# Patient Record
Sex: MALE | Race: WHITE | URBAN - METROPOLITAN AREA
[De-identification: names, ages, dates, MRNs, and addresses within clinical notes are randomized per-mention and may not be internally consistent; named-entity substitution may affect disease eponyms.]

---

## 2018-08-10 ENCOUNTER — OFFICE VISIT (OUTPATIENT)
Dept: URGENT CARE | Facility: URGENT CARE | Age: 68
End: 2018-08-10
Payer: MEDICARE

## 2018-08-10 VITALS
TEMPERATURE: 98 F | HEART RATE: 55 BPM | SYSTOLIC BLOOD PRESSURE: 118 MMHG | OXYGEN SATURATION: 94 % | DIASTOLIC BLOOD PRESSURE: 72 MMHG | RESPIRATION RATE: 16 BRPM

## 2018-08-10 DIAGNOSIS — M79.631 PAIN OF RIGHT FOREARM: ICD-10-CM

## 2018-08-10 DIAGNOSIS — S50.811A FOREARM ABRASION, RIGHT, INITIAL ENCOUNTER: Primary | ICD-10-CM

## 2018-08-10 DIAGNOSIS — Z23 IMMUNIZATION DUE: ICD-10-CM

## 2018-08-10 PROCEDURE — 99203 OFFICE O/P NEW LOW 30 MIN: CPT | Mod: 25 | Performed by: FAMILY MEDICINE

## 2018-08-10 PROCEDURE — 90471 IMMUNIZATION ADMIN: CPT | Performed by: FAMILY MEDICINE

## 2018-08-10 PROCEDURE — 90714 TD VACC NO PRESV 7 YRS+ IM: CPT | Performed by: FAMILY MEDICINE

## 2018-08-10 NOTE — NURSING NOTE
Screening Questionnaire for Adult Immunization    Are you sick today?   No   Do you have allergies to medications, food, a vaccine component or latex?   No   Have you ever had a serious reaction after receiving a vaccination?   No   Do you have a long-term health problem with heart disease, lung disease, asthma, kidney disease, metabolic disease (e.g. diabetes), anemia, or other blood disorder?   No   Do you have cancer, leukemia, HIV/AIDS, or any other immune system problem?   No   In the past 3 months, have you taken medications that affect  your immune system, such as prednisone, other steroids, or anticancer drugs; drugs for the treatment of rheumatoid arthritis, Crohn s disease, or psoriasis; or have you had radiation treatments?   No   Have you had a seizure, or a brain or other nervous system problem?   No   During the past year, have you received a transfusion of blood or blood     products, or been given immune (gamma) globulin or antiviral drug?   No   For women: Are you pregnant or is there a chance you could become        pregnant during the next month?   No   Have you received any vaccinations in the past 4 weeks?   No     Immunization questionnaire answers were all negative.        Per orders of Dr. Kerns, injection of TD given by JUDI ADAMS. Patient instructed to remain in clinic for 15 minutes afterwards, and to report any adverse reaction to me immediately.       Screening performed by JUDI ADAMS on 8/10/2018 at 4:15 PM.

## 2018-08-10 NOTE — PROGRESS NOTES
SUBJECTIVE:  Chief Complaint   Patient presents with     Urgent Care     Pt c/o abrasion on right arm     Sen Mcfadden is a 67 year old male who presents with a chief complaint of right forearm pain and redness.  Symptoms began 1 hour(s) ago, are moderate and sudden onset  Context:  Injury:Yes: .  Injury happened while while helping neighbor at the back yard was fixing a wooden furniture when the wood pice slipped off from the saw and hit his rt forearm . How: trauma: , direct blow immediate pain, deformity was immediately noted.   Pain exacerbated by none Relieved by nothing.  He treated it initially with ice. This is the first time this type of injury has occurred to this patient.     No past medical history on file.  Current Outpatient Prescriptions   Medication Sig Dispense Refill     amLODIPine (NORVASC) 10 MG tablet Take 10 mg by mouth daily       Amlodipine Besylate-Valsartan (EXFORGE) 5-160 MG TABS Take 1 tablet by mouth daily       aspirin (TONY LOW DOSE) 81 MG EC tablet Take 81 mg by mouth daily       atorvastatin (LIPITOR) 10 MG tablet Take 10 mg by mouth daily       levothyroxine (SYNTHROID,LEVOTHROID) 100 MCG tablet Take 1 tablet by mouth daily       LUTEIN PO Take 1 tablet by mouth daily       ofloxacin (OCUFLOX) 0.3 % ophthalmic solution 10 drops daily.        tamsulosin (FLOMAX) 0.4 MG 24 hr capsule Take 1 capsule by mouth daily       zolpidem (AMBIEN) 10 MG tablet Take 1 tablet by mouth nightly as needed       Social History   Substance Use Topics     Smoking status: Former Smoker     Types: Cigarettes     Smokeless tobacco: Never Used     Alcohol use Not on file       ROS:  Review of systems negative except as stated below    EXAM:   /72  Pulse 55  Temp 98  F (36.7  C) (Oral)  Resp 16  SpO2 94%  M/S Exam:forearmerythema, swelling and tenderness to palpationGENERAL APPEARANCE: healthy, alert and no distress  EXTREMITIES: peripheral pulses normal  SKIN: 3 cm swelling noted over the  ulnar aspect of the distal forearm soft in consistency   Related to the injury , there area 3 superficial longitudinal abrasions noted   NEURO: Normal strength and tone, sensory exam grossly normal, mentation intact and speech normal    Skin abrasions cleaned   Then bacitracin applied and then dressed with telfa     X-RAY was not done.    ASSESSMENT:     Encounter Diagnoses   Name Primary?     Forearm abrasion, right, initial encounter Yes     Pain of right forearm      Sen was seen today for urgent care.    Diagnoses and all orders for this visit:    Forearm abrasion, right, initial encounter    Pain of right forearm    Immunization due  -     Discontinue: Td (tetanus & diphtheria toxoids) -  adult formulation - for ages 7 years and older; Inject 0.5 mLs into the muscle once for 1 dose  -     Discontinue: Td (tetanus & diphtheria toxoids) -  adult formulation - for ages 7 years and older; Inject 0.5 mLs into the muscle once for 1 dose  -     TD (ADULT, 7+) PRESERVE FREE      Discussed with pt about signs of infection   If notices any streaking should follow up

## 2018-08-10 NOTE — MR AVS SNAPSHOT
After Visit Summary   8/10/2018    Sen Mcfadden    MRN: [id number]           Patient Information     Date Of Birth          1950        Visit Information        Provider Department      8/10/2018 3:15 PM Radha Kerns MD [facility]        Today's Diagnoses     Forearm abrasion, right, initial encounter    -  1    Pain of right forearm        Immunization due          Care Instructions      Abrasions  Abrasions are skin scrapes. Their treatment depends on how large and deep the abrasion is.  Home care  You may be prescribed an antibiotic cream or ointment to apply to the wound. This helps prevent infection. Follow instructions when using this medicine.  General care    To care for the abrasion, do the following each day for as long as directed by your healthcare provider.  ? If you were given a bandage, change it once a day. If your bandage sticks to the wound, soak it in warm water until it loosens.  ? Wash the area with soap and warm water. You may do this in a sink or under a tub faucet or shower. Rinse off the soap. Then pat the area dry with a clean towel.  ? If antibiotic ointment or cream was prescribed, reapply it to the wound as directed. Cover the wound with a fresh nonstick bandage. If the bandage becomes wet or dirty, change it as soon as possible.  ? Some antibiotic ointments or cream can cause an allergic reaction or dermatitis. This may cause redness, itching and or hives. If this occurs, stop using the ointment immediately and wash off any remaining ointment. You may need to take some allergy medicine to relieve symptoms.    You may use acetaminophen or ibuprofen to control pain unless another pain medicine was prescribed. Talk with your healthcare provider before using these medicines if you have chronic liver or kidney disease or ever had a stomach ulcer or GI bleeding. Don t use ibuprofen in children younger than six months old.    Most skin  "wounds heal within 10 days. But an infection may occur even with treatment. So it s important to watch the wound for signs of infection as listed below.  Follow-up care  Follow up with your healthcare provider, or as advised.  When to seek medical advice  Call your healthcare provider right away if any of these occur:    Fever of 100.4 F (38 C) or higher, or as directed by your healthcare provider    Increasing pain, redness, swelling, or drainage from the wound    Bleeding from the wound that does not stop after a few minutes of steady, firm pressure    Decreased ability to move any body part near the wound  Date Last Reviewed: 3/3/2017    7329-9193 [employer]. [address] [place] [address]. All rights reserved. This information is not intended as a substitute for professional medical care. Always follow your healthcare professional's instructions.                Follow-ups after your visit        Who to contact     If you have questions or need follow up information about today's clinic visit or your schedule please contact [facility] directly at [phone].  Normal or non-critical lab and imaging results will be communicated to you by Allen Brothershart, letter or phone within 4 business days after the clinic has received the results. If you do not hear from us within 7 days, please contact the clinic through [facility] or phone. If you have a critical or abnormal lab result, we will notify you by phone as soon as possible.  Submit refill requests through [employer] or call your pharmacy and they will forward the refill request to us. Please allow 3 business days for your refill to be completed.          Additional Information About Your Visit        [employer] Information     [employer] lets you send messages to your doctor, view your test results, renew your prescriptions, schedule appointments and more. To sign up, go to [email or web address] . Click on \"Log in\" on the " "left side of the screen, which will take you to the Welcome page. Then click on \"Sign up Now\" on the right side of the page.     You will be asked to enter the access code listed below, as well as some personal information. Please follow the directions to create your username and password.     Your access code is: [email or web address]  Expires: 2018  3:49 PM     Your access code will  in 90 days. If you need help or a new code, please call your [facility] clinic or [phone].        Care EveryWhere ID     This is your Care EveryWhere ID. This could be used by other organizations to access your [facility] medical records  [id number]        Your Vitals Were     Pulse Temperature Respirations Pulse Oximetry          55 98  F (36.7  C) (Oral) 16 94%         Blood Pressure from Last 3 Encounters:   08/10/18 118/72   13 133/75    Weight from Last 3 Encounters:   13 198 lb (89.8 kg)              Today, you had the following     No orders found for display         Today's Medication Changes          These changes are accurate as of 8/10/18  3:49 PM.  If you have any questions, ask your nurse or doctor.               Start taking these medicines.        Dose/Directions    tetanus & diphtheria toxoids (adult) 5-2 LFU injection   Used for:  Immunization due   Started by:  Radha Kerns MD        Dose:  0.5 mL   Inject 0.5 mLs into the muscle once for 1 dose   Quantity:  0.5 mL   Refills:  0            Where to get your medicines      Some of these will need a paper prescription and others can be bought over the counter.  Ask your nurse if you have questions.     You don't need a prescription for these medications     tetanus & diphtheria toxoids (adult) 5-2 LFU injection                Primary Care Provider Fax #    Physician No Ref-Primary [phone]       No address on file        Equal Access to Services     ROBERT MORALES AH: Christo De Santiago, mahogany kinney, yenifer chanel " lorraine escobedogretchen ramirez'aan ah. So [facility] [phone].    ATENCIÓN: Si pinkyla chauncey, tiene a gleason disposición servicios gratuitos de asistencia lingüística. Chase guallpa [phone].    We comply with applicable federal civil rights laws and [place] laws. We do not discriminate on the basis of race, color, national origin, age, disability, sex, sexual orientation, or gender identity.            Thank you!     Thank you for choosing [facility]  for your care. Our goal is always to provide you with excellent care. Hearing back from our patients is one way we can continue to improve our services. Please take a few minutes to complete the written survey that you may receive in the mail after your visit with us. Thank you!             Your Updated Medication List - Protect others around you: Learn how to safely use, store and throw away your medicines at www.disposemymeds.org.          This list is accurate as of 8/10/18  3:49 PM.  Always use your most recent med list.                   Brand Name Dispense Instructions for use Diagnosis    amLODIPine 10 MG tablet    NORVASC     Take 10 mg by mouth daily        TONY LOW DOSE 81 MG EC tablet   Generic drug:  aspirin      Take 81 mg by mouth daily        EXFORGE 5-160 MG Tabs   Generic drug:  Amlodipine Besylate-Valsartan      Take 1 tablet by mouth daily        levothyroxine 100 MCG tablet    SYNTHROID/LEVOTHROID     Take 1 tablet by mouth daily        LIPITOR 10 MG tablet   Generic drug:  atorvastatin      Take 10 mg by mouth daily        LUTEIN PO      Take 1 tablet by mouth daily        ofloxacin 0.3 % ophthalmic solution    OCUFLOX     10 drops daily.        tamsulosin 0.4 MG capsule    FLOMAX     Take 1 capsule by mouth daily        tetanus & diphtheria toxoids (adult) 5-2 LFU injection     0.5 mL    Inject 0.5 mLs into the muscle once for 1 dose    Immunization due       zolpidem 10 MG tablet    AMBIEN     Take 1 tablet by mouth  nightly as needed

## 2018-08-10 NOTE — PATIENT INSTRUCTIONS
Abrasions  Abrasions are skin scrapes. Their treatment depends on how large and deep the abrasion is.  Home care  You may be prescribed an antibiotic cream or ointment to apply to the wound. This helps prevent infection. Follow instructions when using this medicine.  General care    To care for the abrasion, do the following each day for as long as directed by your healthcare provider.  ? If you were given a bandage, change it once a day. If your bandage sticks to the wound, soak it in warm water until it loosens.  ? Wash the area with soap and warm water. You may do this in a sink or under a tub faucet or shower. Rinse off the soap. Then pat the area dry with a clean towel.  ? If antibiotic ointment or cream was prescribed, reapply it to the wound as directed. Cover the wound with a fresh nonstick bandage. If the bandage becomes wet or dirty, change it as soon as possible.  ? Some antibiotic ointments or cream can cause an allergic reaction or dermatitis. This may cause redness, itching and or hives. If this occurs, stop using the ointment immediately and wash off any remaining ointment. You may need to take some allergy medicine to relieve symptoms.    You may use acetaminophen or ibuprofen to control pain unless another pain medicine was prescribed. Talk with your healthcare provider before using these medicines if you have chronic liver or kidney disease or ever had a stomach ulcer or GI bleeding. Don t use ibuprofen in children younger than six months old.    Most skin wounds heal within 10 days. But an infection may occur even with treatment. So it s important to watch the wound for signs of infection as listed below.  Follow-up care  Follow up with your healthcare provider, or as advised.  When to seek medical advice  Call your healthcare provider right away if any of these occur:    Fever of 100.4 F (38 C) or higher, or as directed by your healthcare provider    Increasing pain, redness, swelling, or  drainage from the wound    Bleeding from the wound that does not stop after a few minutes of steady, firm pressure    Decreased ability to move any body part near the wound  Date Last Reviewed: 3/3/2017    3303-2890 [employer]. [address] [place] [address], [place] [address]. All rights reserved. This information is not intended as a substitute for professional medical care. Always follow your healthcare professional's instructions.